# Patient Record
Sex: MALE | Race: BLACK OR AFRICAN AMERICAN | NOT HISPANIC OR LATINO
[De-identification: names, ages, dates, MRNs, and addresses within clinical notes are randomized per-mention and may not be internally consistent; named-entity substitution may affect disease eponyms.]

---

## 2023-11-08 PROBLEM — Z00.00 ENCOUNTER FOR PREVENTIVE HEALTH EXAMINATION: Status: ACTIVE | Noted: 2023-11-08

## 2023-11-10 ENCOUNTER — APPOINTMENT (OUTPATIENT)
Dept: PAIN MANAGEMENT | Facility: CLINIC | Age: 53
End: 2023-11-10
Payer: COMMERCIAL

## 2023-11-10 DIAGNOSIS — Z86.59 PERSONAL HISTORY OF OTHER MENTAL AND BEHAVIORAL DISORDERS: ICD-10-CM

## 2023-11-10 PROCEDURE — 99204 OFFICE O/P NEW MOD 45 MIN: CPT

## 2023-11-10 RX ORDER — GABAPENTIN 400 MG/1
400 CAPSULE ORAL
Refills: 0 | Status: ACTIVE | COMMUNITY

## 2023-11-10 RX ORDER — METOPROLOL SUCCINATE 25 MG/1
25 TABLET, EXTENDED RELEASE ORAL
Refills: 0 | Status: ACTIVE | COMMUNITY

## 2023-11-10 RX ORDER — GABAPENTIN 400 MG/1
400 CAPSULE ORAL 3 TIMES DAILY
Qty: 90 | Refills: 3 | Status: ACTIVE | COMMUNITY
Start: 2023-11-10 | End: 1900-01-01

## 2023-11-30 ENCOUNTER — APPOINTMENT (OUTPATIENT)
Dept: PAIN MANAGEMENT | Facility: CLINIC | Age: 53
End: 2023-11-30

## 2024-01-10 ENCOUNTER — NON-APPOINTMENT (OUTPATIENT)
Age: 54
End: 2024-01-10

## 2024-01-17 ENCOUNTER — APPOINTMENT (OUTPATIENT)
Dept: PAIN MANAGEMENT | Facility: CLINIC | Age: 54
End: 2024-01-17
Payer: COMMERCIAL

## 2024-01-17 VITALS
SYSTOLIC BLOOD PRESSURE: 160 MMHG | WEIGHT: 270 LBS | DIASTOLIC BLOOD PRESSURE: 100 MMHG | BODY MASS INDEX: 37.8 KG/M2 | HEIGHT: 71 IN

## 2024-01-17 VITALS — DIASTOLIC BLOOD PRESSURE: 118 MMHG | SYSTOLIC BLOOD PRESSURE: 180 MMHG

## 2024-01-17 PROCEDURE — 99204 OFFICE O/P NEW MOD 45 MIN: CPT

## 2024-01-17 PROCEDURE — G2211 COMPLEX E/M VISIT ADD ON: CPT

## 2024-01-17 RX ORDER — TIZANIDINE 2 MG/1
2 TABLET ORAL
Qty: 45 | Refills: 1 | Status: ACTIVE | COMMUNITY
Start: 2024-01-17 | End: 1900-01-01
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
## 2024-01-17 NOTE — ASSESSMENT
[FreeTextEntry1] : >> Imaging and Other Studies   I personally reviewed the relevant imaging.  Discussed and explained to patient the likely source of pathology and pain.  Questions answered. MRI   >> Therapy and Other Modalities   PT  >> Medications  I advised KOREY that the NSAID should be taken with food.  In addition while taking the prescribed NSAID, no over the counter or other NSAIDs should be used, such as ibuprofen (Motrin or Advil) or naproxen (Aleve) as this can cause stomach upset or other side effects.  If needed for fever or breakthrough pain Tylenol can be used.  acetaminophen 650mg q8h prn pain (caution <3g daily)  gabapentin 400mg TID  trial tizanidine    >> Interventions   Significant component of back and leg pain likely secondary to lumbar spinal stenosis demonstrated on MRI LS.  Will schedule L4-5 interlaminar epidural steroid injection r/b/a discussed  after patient improves bp control  >> Consults   hypertensive urgency - patient brought to ED  denies cp, sob, blurry vision  >> Discussion of Risks/Benefits/Alternatives    >Regarding any scheduled procedures:   In the event the patient is scheduled for a procedure,   I have discussed in detail with the patient that any interventional pain procedure is associated with potential risks.  The procedure may include an injection of steroids and potentially other medications (local anesthetic and normal saline) into the epidural space or surrounding tissue of the spine.  There are significant risks of this procedure which include and are not limited to infection, bleeding, worsening pain, dural puncture leading to postdural puncture headache, nerve damage, spinal cord injury, paralysis, stroke, and death.   There is a chance that the procedure does not improve their pain.   There are risks associated with the steroid being absorbed into the body systemically.  These include dysphoria, difficulty sleeping, mood swings and personality changes.  Premenopausal women may notice an irregularity in her menstrual cycle for 2-3 months following the injection.  Steroids can specifically affect patients with hypertension, diabetes, and peptic ulcers.  The procedure may cause a temporary increase in blood pressure and blood pressure, and may adversely affect a peptic ulcer.  Other, more rare complications, include avascular necrosis of joints, glaucoma and worsening of osteoporosis.   I have discussed the risks of the procedure at length with the patient, and the potential benefits of pain relief.  I have offered alternatives to the procedure.  All questions were answered.   The patient expressed understanding and wishes to proceed with the procedure.   > Longitudinal management of Complex Painful condition   The patient is being managed for a complex condition that requires ongoing management.  The nature of this condition demands nuanced approach to treatment.  The seriousness of the condition necessitates an in-depth and focused approach to management and coordination with other healthcare professionals.    This visit involves intricate evaluation and management of the patient's condition.  The complexity of the visit was due to the need for detailed assessment of the current state, consideration of potential complications and a careful balancing of treatment options to management the chronic condition effectively.   As detailed above, the patient has a chronic significant painful condition that requires regular and detailed management.  The condition's impact on the patient's quality of life and health is substantial and necessitates a comprehensive and tailored approach   >> Conclusion   There were no barriers to communication. Informed patient that I would be available for any additional questions. Patient was instructed to call with any worsening symptoms including severe pain, new numbness/weakness, or changes in the bowel/bladder function. Discussed role of nsaids in pain management and all relevant risks, if patient is continuing to require after 4 weeks the patient should f/u for alternative treatment. Instructed patient to maintain pain diary to monitor pain level, mobility, and function.

## 2024-02-02 ENCOUNTER — RESULT REVIEW (OUTPATIENT)
Age: 54
End: 2024-02-02

## 2024-02-02 ENCOUNTER — APPOINTMENT (OUTPATIENT)
Dept: NEPHROLOGY | Facility: CLINIC | Age: 54
End: 2024-02-02
Payer: COMMERCIAL

## 2024-02-02 ENCOUNTER — APPOINTMENT (OUTPATIENT)
Dept: PAIN MANAGEMENT | Facility: CLINIC | Age: 54
End: 2024-02-02
Payer: COMMERCIAL

## 2024-02-02 VITALS
WEIGHT: 270 LBS | HEIGHT: 71 IN | OXYGEN SATURATION: 99 % | BODY MASS INDEX: 37.8 KG/M2 | DIASTOLIC BLOOD PRESSURE: 98 MMHG | SYSTOLIC BLOOD PRESSURE: 150 MMHG | HEART RATE: 70 BPM

## 2024-02-02 DIAGNOSIS — M51.9 UNSPECIFIED THORACIC, THORACOLUMBAR AND LUMBOSACRAL INTERVERTEBRAL DISC DISORDER: ICD-10-CM

## 2024-02-02 DIAGNOSIS — I16.0 HYPERTENSIVE URGENCY: ICD-10-CM

## 2024-02-02 DIAGNOSIS — M51.26 OTHER INTERVERTEBRAL DISC DISPLACEMENT, LUMBAR REGION: ICD-10-CM

## 2024-02-02 PROCEDURE — G2211 COMPLEX E/M VISIT ADD ON: CPT

## 2024-02-02 PROCEDURE — 99214 OFFICE O/P EST MOD 30 MIN: CPT

## 2024-02-02 PROCEDURE — 99204 OFFICE O/P NEW MOD 45 MIN: CPT

## 2024-02-02 NOTE — ASSESSMENT
[FreeTextEntry1] : # HTN  -dx in his 30s, FHx of HTN  -BP has been 200-170s/100 -BP today 150/98 at the office  - cont metoprolol  - start hydrochlorothiazide 25 mg daily  - Lab, cbc, cmp, cystatin c, lovely, renin, Renal US  - Low salt diet  - 2 weeks BP monitor  - Need pain control  - Avoid NSAID   # LUTS possible BPH Renal US  - Reported dribbling, weak urinary stream, start doxazocin 2 mg bedtime

## 2024-02-02 NOTE — HISTORY OF PRESENT ILLNESS
[FreeTextEntry1] : Hx of HTN since his 30's. Mom and dad with hx of HTN  He has lumbar spine disck herniation, he went to pain management for injection. BP was elevated 210/104 and was sent to ed.  Today his injection was also cancelled due to elevated BP and was sent to office for BP management. He is currently taking metoprolol 25 mg bid.  No headache, change in vision, or weakness.  Reported weak urinary stream and dribbling with urination.  No hx of kidney disease.  currently taking meloxicam

## 2024-02-02 NOTE — ASSESSMENT
[FreeTextEntry1] : >> Imaging and Other Studies   I personally reviewed the relevant imaging.  Discussed and explained to patient the likely source of pathology and pain.  Questions answered. MRI   >> Therapy and Other Modalities   PT  >> Medications  I advised KOREY that the NSAID should be taken with food.  In addition while taking the prescribed NSAID, no over the counter or other NSAIDs should be used, such as ibuprofen (Motrin or Advil) or naproxen (Aleve) as this can cause stomach upset or other side effects.  If needed for fever or breakthrough pain Tylenol can be used.  acetaminophen 650mg q8h prn pain (caution <3g daily)  gabapentin 400mg TID  trial tizanidine    >> Interventions   SAMPSON postponed today secondary to hypertensive urgency  Significant component of back and leg pain likely secondary to lumbar spinal stenosis demonstrated on MRI LS.  Will schedule L4-5 interlaminar epidural steroid injection r/b/a discussed  after patient improves bp control  >> Consults   hypertensive urgency - referral to Dr. Lewis today for evaluation and treatment  denies cp, sob, blurry vision  >> Discussion of Risks/Benefits/Alternatives    >Regarding any scheduled procedures:   In the event the patient is scheduled for a procedure,   I have discussed in detail with the patient that any interventional pain procedure is associated with potential risks.  The procedure may include an injection of steroids and potentially other medications (local anesthetic and normal saline) into the epidural space or surrounding tissue of the spine.  There are significant risks of this procedure which include and are not limited to infection, bleeding, worsening pain, dural puncture leading to postdural puncture headache, nerve damage, spinal cord injury, paralysis, stroke, and death.   There is a chance that the procedure does not improve their pain.   There are risks associated with the steroid being absorbed into the body systemically.  These include dysphoria, difficulty sleeping, mood swings and personality changes.  Premenopausal women may notice an irregularity in her menstrual cycle for 2-3 months following the injection.  Steroids can specifically affect patients with hypertension, diabetes, and peptic ulcers.  The procedure may cause a temporary increase in blood pressure and blood pressure, and may adversely affect a peptic ulcer.  Other, more rare complications, include avascular necrosis of joints, glaucoma and worsening of osteoporosis.   I have discussed the risks of the procedure at length with the patient, and the potential benefits of pain relief.  I have offered alternatives to the procedure.  All questions were answered.   The patient expressed understanding and wishes to proceed with the procedure.   > Longitudinal management of Complex Painful condition   The patient is being managed for a complex condition that requires ongoing management.  The nature of this condition demands nuanced approach to treatment.  The seriousness of the condition necessitates an in-depth and focused approach to management and coordination with other healthcare professionals.    This visit involves intricate evaluation and management of the patient's condition.  The complexity of the visit was due to the need for detailed assessment of the current state, consideration of potential complications and a careful balancing of treatment options to management the chronic condition effectively.   As detailed above, the patient has a chronic significant painful condition that requires regular and detailed management.  The condition's impact on the patient's quality of life and health is substantial and necessitates a comprehensive and tailored approach   >> Conclusion   There were no barriers to communication. Informed patient that I would be available for any additional questions. Patient was instructed to call with any worsening symptoms including severe pain, new numbness/weakness, or changes in the bowel/bladder function. Discussed role of nsaids in pain management and all relevant risks, if patient is continuing to require after 4 weeks the patient should f/u for alternative treatment. Instructed patient to maintain pain diary to monitor pain level, mobility, and function.
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
## 2024-02-02 NOTE — REVIEW OF SYSTEMS
[Fever] : no fever [Chills] : no chills [Chest Pain] : no chest pain [Palpitations] : no palpitations [Cough] : no cough [SOB on Exertion] : no shortness of breath during exertion [Constipation] : no constipation [Diarrhea] : no diarrhea [Dysuria] : no dysuria [Nocturia] : nocturia

## 2024-02-02 NOTE — PHYSICAL EXAM
[General Appearance - Alert] : alert [General Appearance - In No Acute Distress] : in no acute distress [Respiration, Rhythm And Depth] : normal respiratory rhythm and effort [Auscultation Breath Sounds / Voice Sounds] : lungs were clear to auscultation bilaterally [Heart Rate And Rhythm] : heart rate was normal and rhythm regular [Heart Sounds] : normal S1 and S2 [Edema] : there was no peripheral edema [Abdomen Soft] : soft [Abdomen Tenderness] : non-tender [Abnormal Walk] : normal gait

## 2024-02-16 ENCOUNTER — APPOINTMENT (OUTPATIENT)
Dept: NEPHROLOGY | Facility: CLINIC | Age: 54
End: 2024-02-16
Payer: COMMERCIAL

## 2024-02-16 VITALS — SYSTOLIC BLOOD PRESSURE: 142 MMHG | DIASTOLIC BLOOD PRESSURE: 88 MMHG

## 2024-02-16 PROCEDURE — ZZZZZ: CPT

## 2024-03-01 ENCOUNTER — RESULT REVIEW (OUTPATIENT)
Age: 54
End: 2024-03-01

## 2024-03-01 ENCOUNTER — APPOINTMENT (OUTPATIENT)
Dept: NEPHROLOGY | Facility: CLINIC | Age: 54
End: 2024-03-01
Payer: COMMERCIAL

## 2024-03-01 PROCEDURE — 36415 COLL VENOUS BLD VENIPUNCTURE: CPT

## 2024-03-04 ENCOUNTER — TRANSCRIPTION ENCOUNTER (OUTPATIENT)
Age: 54
End: 2024-03-04

## 2024-03-20 ENCOUNTER — APPOINTMENT (OUTPATIENT)
Dept: NEPHROLOGY | Facility: CLINIC | Age: 54
End: 2024-03-20
Payer: COMMERCIAL

## 2024-03-20 VITALS
HEIGHT: 71 IN | BODY MASS INDEX: 38.78 KG/M2 | HEART RATE: 59 BPM | OXYGEN SATURATION: 98 % | DIASTOLIC BLOOD PRESSURE: 80 MMHG | WEIGHT: 277 LBS | SYSTOLIC BLOOD PRESSURE: 122 MMHG

## 2024-03-20 PROCEDURE — 99214 OFFICE O/P EST MOD 30 MIN: CPT

## 2024-03-20 RX ORDER — MELOXICAM 15 MG/1
15 TABLET ORAL TWICE DAILY
Qty: 60 | Refills: 0 | Status: DISCONTINUED | COMMUNITY
Start: 2023-11-10 | End: 2024-03-20

## 2024-03-20 NOTE — HISTORY OF PRESENT ILLNESS
[FreeTextEntry1] : Hx of HTN since his 30's. Mom and dad with hx of HTN  He has lumbar spine disck herniation, he went to pain management for injection. BP was elevated 210/104 and was sent to ed.  Today his injection was also cancelled due to elevated BP and was sent to office for BP management. He is currently taking metoprolol 25 mg bid.  No headache, change in vision, or weakness.  Reported weak urinary stream and dribbling with urination.  No hx of kidney disease.  currently taking meloxicam   3/19 Patient is feeling well but reported being more tired with poor sleep due to work and home family obligations.  BP fluctuating at home sbp 160-140, however sometime feel not himself, feel off once take his bp med all at once. Had not taken his BP during those episodes.   His back pain has significantly improved and does not feel like he needs more injection.  He reported taking his med and adherent to low salt diet.  his metoprolol dose was increase to 50 mg by his pain management MD. Urinary symptoms impred

## 2024-03-20 NOTE — PHYSICAL EXAM
[General Appearance - Alert] : alert [General Appearance - In No Acute Distress] : in no acute distress [Heart Sounds] : normal S1 and S2 [Heart Rate And Rhythm] : heart rate was normal and rhythm regular [Abnormal Walk] : normal gait [Edema] : there was no peripheral edema [Oriented To Time, Place, And Person] : oriented to person, place, and time [Impaired Insight] : insight and judgment were intact

## 2024-03-20 NOTE — REVIEW OF SYSTEMS
[Fever] : no fever [Chest Pain] : no chest pain [Chills] : no chills [Palpitations] : no palpitations [Cough] : no cough [SOB on Exertion] : no shortness of breath during exertion [Constipation] : no constipation [Diarrhea] : no diarrhea [Dysuria] : no dysuria [Hesitancy] : no urinary hesitancy [Dizziness] : no dizziness

## 2024-03-20 NOTE — ASSESSMENT
[FreeTextEntry1] : # HTN improving uncontrolled, -dx in his 30s, FHx of HTN -BP has been 200-170s/100, now 160-140s/80s -BP in the office 122/80, took his med this morning. Reported episode of feeling off after taking his med. Advise to monitor his BP during those episodes, unclear if related to hypotensive episode. He was also taking Cardura in the morning, advise to take bedtime which can cause some dizziness sometimes.  - cont metoprolol 25 mg bid - cont hydrochlorothiazide 25 mg daily - cont losartan 25 mg with goal to increase if bp not controlled - Low salt diet - 4 weeks BP monitor - Avoid NSAID, advise good sleep hygiene  # LUTS possible BPH Renal US - Reported dribbling, weak urinary stream, symptoms improved.  - cont  doxazocin 2 mg bedtime.

## 2024-04-16 ENCOUNTER — APPOINTMENT (OUTPATIENT)
Dept: PAIN MANAGEMENT | Facility: CLINIC | Age: 54
End: 2024-04-16
Payer: COMMERCIAL

## 2024-04-16 VITALS
WEIGHT: 277 LBS | DIASTOLIC BLOOD PRESSURE: 81 MMHG | SYSTOLIC BLOOD PRESSURE: 158 MMHG | BODY MASS INDEX: 38.78 KG/M2 | HEIGHT: 71 IN

## 2024-04-16 DIAGNOSIS — M79.18 MYALGIA, OTHER SITE: ICD-10-CM

## 2024-04-16 DIAGNOSIS — G89.4 CHRONIC PAIN SYNDROME: ICD-10-CM

## 2024-04-16 DIAGNOSIS — M79.2 NEURALGIA AND NEURITIS, UNSPECIFIED: ICD-10-CM

## 2024-04-16 DIAGNOSIS — M54.16 RADICULOPATHY, LUMBAR REGION: ICD-10-CM

## 2024-04-16 PROCEDURE — 99214 OFFICE O/P EST MOD 30 MIN: CPT

## 2024-04-16 NOTE — ASSESSMENT
[FreeTextEntry1] : >> Imaging and Other Studies   I personally reviewed the relevant imaging.  Discussed and explained to patient the likely source of pathology and pain.  Questions answered. MRI   >> Therapy and Other Modalities   exercises  >> Medications  I advised KOREY that the NSAID should be taken with food.  In addition while taking the prescribed NSAID, no over the counter or other NSAIDs should be used, such as ibuprofen (Motrin or Advil) or naproxen (Aleve) as this can cause stomach upset or other side effects.  If needed for fever or breakthrough pain Tylenol can be used.  acetaminophen 650mg q8h prn pain (caution <3g daily)  weaned from gabapentin   continue tizanidine    >> Interventions    Significant component of back and leg pain likely secondary to lumbar spinal stenosis demonstrated on MRI LS.  Will consider L4-5 interlaminar epidural steroid injection r/b/a discussed  after patient improves bp control  >> Consults   hypertensive urgency - referral to Dr. Lewis today for evaluation and treatment  denies cp, sob, blurry vision  >> Discussion of Risks/Benefits/Alternatives    >Regarding any scheduled procedures:   In the event the patient is scheduled for a procedure,   I have discussed in detail with the patient that any interventional pain procedure is associated with potential risks.  The procedure may include an injection of steroids and potentially other medications (local anesthetic and normal saline) into the epidural space or surrounding tissue of the spine.  There are significant risks of this procedure which include and are not limited to infection, bleeding, worsening pain, dural puncture leading to postdural puncture headache, nerve damage, spinal cord injury, paralysis, stroke, and death.   There is a chance that the procedure does not improve their pain.   There are risks associated with the steroid being absorbed into the body systemically.  These include dysphoria, difficulty sleeping, mood swings and personality changes.  Premenopausal women may notice an irregularity in her menstrual cycle for 2-3 months following the injection.  Steroids can specifically affect patients with hypertension, diabetes, and peptic ulcers.  The procedure may cause a temporary increase in blood pressure and blood pressure, and may adversely affect a peptic ulcer.  Other, more rare complications, include avascular necrosis of joints, glaucoma and worsening of osteoporosis.   I have discussed the risks of the procedure at length with the patient, and the potential benefits of pain relief.  I have offered alternatives to the procedure.  All questions were answered.   The patient expressed understanding and wishes to proceed with the procedure.   > Longitudinal management of Complex Painful condition   The patient is being managed for a complex condition that requires ongoing management.  The nature of this condition demands nuanced approach to treatment.  The seriousness of the condition necessitates an in-depth and focused approach to management and coordination with other healthcare professionals.    This visit involves intricate evaluation and management of the patient's condition.  The complexity of the visit was due to the need for detailed assessment of the current state, consideration of potential complications and a careful balancing of treatment options to management the chronic condition effectively.   As detailed above, the patient has a chronic significant painful condition that requires regular and detailed management.  The condition's impact on the patient's quality of life and health is substantial and necessitates a comprehensive and tailored approach   >> Conclusion   There were no barriers to communication. Informed patient that I would be available for any additional questions. Patient was instructed to call with any worsening symptoms including severe pain, new numbness/weakness, or changes in the bowel/bladder function. Discussed role of nsaids in pain management and all relevant risks, if patient is continuing to require after 4 weeks the patient should f/u for alternative treatment. Instructed patient to maintain pain diary to monitor pain level, mobility, and function.
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
## 2024-04-16 NOTE — PHYSICAL EXAM
[Normal muscle bulk without asymmetry] : normal muscle bulk without asymmetry [] : Motor: [Normal] : Normal affect

## 2024-04-17 ENCOUNTER — APPOINTMENT (OUTPATIENT)
Dept: NEPHROLOGY | Facility: CLINIC | Age: 54
End: 2024-04-17
Payer: COMMERCIAL

## 2024-04-17 PROCEDURE — 99211 OFF/OP EST MAY X REQ PHY/QHP: CPT

## 2024-04-30 ENCOUNTER — APPOINTMENT (OUTPATIENT)
Dept: NEPHROLOGY | Facility: CLINIC | Age: 54
End: 2024-04-30
Payer: COMMERCIAL

## 2024-04-30 VITALS
BODY MASS INDEX: 38.36 KG/M2 | SYSTOLIC BLOOD PRESSURE: 140 MMHG | DIASTOLIC BLOOD PRESSURE: 70 MMHG | WEIGHT: 274 LBS | HEART RATE: 61 BPM | HEIGHT: 71 IN | OXYGEN SATURATION: 97 %

## 2024-04-30 DIAGNOSIS — I10 ESSENTIAL (PRIMARY) HYPERTENSION: ICD-10-CM

## 2024-04-30 PROCEDURE — 99214 OFFICE O/P EST MOD 30 MIN: CPT

## 2024-04-30 RX ORDER — LOSARTAN POTASSIUM 50 MG/1
50 TABLET, FILM COATED ORAL DAILY
Qty: 90 | Refills: 0 | Status: ACTIVE | COMMUNITY
Start: 2024-02-16 | End: 1900-01-01

## 2024-04-30 RX ORDER — HYDROCHLOROTHIAZIDE 25 MG/1
25 TABLET ORAL DAILY
Qty: 90 | Refills: 1 | Status: ACTIVE | COMMUNITY
Start: 2024-02-02 | End: 1900-01-01

## 2024-04-30 NOTE — REVIEW OF SYSTEMS
[Fever] : no fever [Chills] : no chills [Chest Pain] : no chest pain [Palpitations] : no palpitations [Cough] : no cough [SOB on Exertion] : no shortness of breath during exertion [Dysuria] : no dysuria [Hesitancy] : no urinary hesitancy [Nocturia] : no nocturia [Dizziness] : no dizziness

## 2024-04-30 NOTE — ASSESSMENT
[FreeTextEntry1] : # HTN improving uncontrolled, -dx in his 30s, FHx of HTN -BP has been 200-170s/100, now 140-130s/80s - Secondary HTN work up with low renin, low aldosterone level, likely normal physiologic response to HTN - Egfr 60-70 ml/mn - cont metoprolol 25 mg bid - cont hydrochlorothiazide 25 mg daily - increase  losartan to 50 mg  - Low salt diet, weight loss reinforced - Avoid NSAID, advise good sleep hygiene -Repeat lab  # LUTS possible BPH -Renal US - Reported dribbling, weak urinary stream, symptoms improved. - cont doxazocin 2 mg bedtime.

## 2024-04-30 NOTE — PHYSICAL EXAM
[General Appearance - Alert] : alert [General Appearance - In No Acute Distress] : in no acute distress [Heart Rate And Rhythm] : heart rate was normal and rhythm regular [Heart Sounds] : normal S1 and S2 [Edema] : there was no peripheral edema [Abnormal Walk] : normal gait [Oriented To Time, Place, And Person] : oriented to person, place, and time

## 2024-04-30 NOTE — HISTORY OF PRESENT ILLNESS
[FreeTextEntry1] : Hx of HTN since his 30's. Mom and dad with hx of HTN  He has lumbar spine disck herniation, he went to pain management for injection. BP was elevated 210/104 and was sent to ed.  Today his injection was also cancelled due to elevated BP and was sent to office for BP management. He is currently taking metoprolol 25 mg bid.  No headache, change in vision, or weakness.  Reported weak urinary stream and dribbling with urination.  No hx of kidney disease.  currently taking meloxicam   3/19 Patient is feeling well but reported being more tired with poor sleep due to work and home family obligations.  BP fluctuating at home sbp 160-140, however sometime feel not himself, feel off once take his bp med all at once. Had not taken his BP during those episodes.   His back pain has significantly improved and does not feel like he needs more injection.  He reported taking his med and adherent to low salt diet.  Urinary symptoms impr  4/30  He is feeling well, He is working on losing weight and adherence to low salt diet which is challenging as patient often eat out. Home -130.

## 2024-05-13 ENCOUNTER — RX RENEWAL (OUTPATIENT)
Age: 54
End: 2024-05-13

## 2024-05-13 RX ORDER — TIZANIDINE 2 MG/1
2 TABLET ORAL
Qty: 45 | Refills: 0 | Status: ACTIVE | COMMUNITY
Start: 2024-02-02 | End: 1900-01-01

## 2024-05-21 ENCOUNTER — APPOINTMENT (OUTPATIENT)
Dept: PAIN MANAGEMENT | Facility: CLINIC | Age: 54
End: 2024-05-21

## 2024-05-30 RX ORDER — DOXAZOSIN 2 MG/1
2 TABLET ORAL DAILY
Qty: 90 | Refills: 0 | Status: ACTIVE | COMMUNITY
Start: 2024-02-02 | End: 1900-01-01

## 2024-07-17 ENCOUNTER — RESULT REVIEW (OUTPATIENT)
Age: 54
End: 2024-07-17

## 2024-07-17 ENCOUNTER — APPOINTMENT (OUTPATIENT)
Dept: NEPHROLOGY | Facility: CLINIC | Age: 54
End: 2024-07-17
Payer: COMMERCIAL

## 2024-07-17 DIAGNOSIS — I10 ESSENTIAL (PRIMARY) HYPERTENSION: ICD-10-CM

## 2024-07-17 DIAGNOSIS — I16.0 HYPERTENSIVE URGENCY: ICD-10-CM

## 2024-07-17 PROCEDURE — 36415 COLL VENOUS BLD VENIPUNCTURE: CPT

## 2024-07-24 ENCOUNTER — APPOINTMENT (OUTPATIENT)
Dept: NEPHROLOGY | Facility: CLINIC | Age: 54
End: 2024-07-24

## 2024-09-06 ENCOUNTER — APPOINTMENT (OUTPATIENT)
Dept: NEPHROLOGY | Facility: CLINIC | Age: 54
End: 2024-09-06
Payer: COMMERCIAL

## 2024-09-06 VITALS
HEART RATE: 79 BPM | BODY MASS INDEX: 38.36 KG/M2 | WEIGHT: 274 LBS | DIASTOLIC BLOOD PRESSURE: 80 MMHG | OXYGEN SATURATION: 98 % | SYSTOLIC BLOOD PRESSURE: 160 MMHG | HEIGHT: 71 IN

## 2024-09-06 DIAGNOSIS — G47.30 SLEEP APNEA, UNSPECIFIED: ICD-10-CM

## 2024-09-06 DIAGNOSIS — I10 ESSENTIAL (PRIMARY) HYPERTENSION: ICD-10-CM

## 2024-09-06 PROCEDURE — G2211 COMPLEX E/M VISIT ADD ON: CPT

## 2024-09-06 PROCEDURE — 99214 OFFICE O/P EST MOD 30 MIN: CPT

## 2024-09-06 NOTE — HISTORY OF PRESENT ILLNESS
[FreeTextEntry1] : Hx of HTN since his 30's. Mom and dad with hx of HTN  He has lumbar spine disck herniation, he went to pain management for injection. BP was elevated 210/104 and was sent to ed.  Today his injection was also cancelled due to elevated BP and was sent to office for BP management. He is currently taking metoprolol 25 mg bid.  No headache, change in vision, or weakness.  Reported weak urinary stream and dribbling with urination.  No hx of kidney disease.  currently taking meloxicam   3/19 Patient is feeling well but reported being more tired with poor sleep due to work and home family obligations.  BP fluctuating at home sbp 160-140, however sometime feel not himself, feel off once take his bp med all at once. Had not taken his BP during those episodes.   His back pain has significantly improved and does not feel like he needs more injection.  He reported taking his med and adherent to low salt diet.  Urinary symptoms impr  4/30  He is feeling well, He is working on losing weight and adherence to low salt diet which is challenging as patient often eat out. Home -130.  9/6  BP high 160/80 Has no complaints. Reported has been cutting down sodium on diet. He has hx of ROMI, he had not use his cpap for the past 2 weeks as part of it is broken, he just got a new machine.  Lab reviewed and discussed with patient.  Patient woks overnight, he had not taken a rest yet, he took his med last night and this morning.

## 2024-09-06 NOTE — ASSESSMENT
[FreeTextEntry1] : # HTN BP uncontrolled, -dx in his 30s, FHx of HTN -BP was improving 140-130s/80s now rising again 160/80 today. Report compliance to medication but poor adherence to low salt diet. - Secondary HTN work up with low renin, low aldosterone level, likely normal physiologic response to HTN - cystatin c 1.08,  Egfr 73 ml/mn - cont metoprolol 25 mg bid - cont hydrochlorothiazide 25 mg daily - increase losartan to 100 mg - Low salt diet, weight loss reinforced - Avoid NSAID, advise good sleep hygiene. Encouraged to use PAP for ROMI -Repeat bmp 2 weeks  # LUTS possible BPH, improved -Renal US, not done, will reorder - cont doxazocin 2 mg bedtime.

## 2024-10-07 DIAGNOSIS — I10 ESSENTIAL (PRIMARY) HYPERTENSION: ICD-10-CM

## 2024-10-07 RX ORDER — METOPROLOL SUCCINATE 25 MG/1
25 TABLET, EXTENDED RELEASE ORAL TWICE DAILY
Qty: 180 | Refills: 1 | Status: DISCONTINUED | COMMUNITY
Start: 2024-10-07 | End: 2024-10-07

## 2024-10-07 RX ORDER — METOPROLOL SUCCINATE 25 MG/1
25 TABLET, EXTENDED RELEASE ORAL TWICE DAILY
Qty: 180 | Refills: 0 | Status: ACTIVE | COMMUNITY
Start: 2024-10-07 | End: 1900-01-01

## 2024-12-04 ENCOUNTER — APPOINTMENT (OUTPATIENT)
Dept: NEPHROLOGY | Facility: CLINIC | Age: 54
End: 2024-12-04
Payer: COMMERCIAL

## 2024-12-04 VITALS
DIASTOLIC BLOOD PRESSURE: 90 MMHG | OXYGEN SATURATION: 99 % | HEART RATE: 60 BPM | BODY MASS INDEX: 38.78 KG/M2 | SYSTOLIC BLOOD PRESSURE: 150 MMHG | HEIGHT: 71 IN | WEIGHT: 277 LBS

## 2024-12-04 DIAGNOSIS — I10 ESSENTIAL (PRIMARY) HYPERTENSION: ICD-10-CM

## 2024-12-04 PROCEDURE — 99214 OFFICE O/P EST MOD 30 MIN: CPT

## 2024-12-04 RX ORDER — LOSARTAN POTASSIUM AND HYDROCHLOROTHIAZIDE 25; 100 MG/1; MG/1
100-25 TABLET ORAL DAILY
Qty: 90 | Refills: 1 | Status: ACTIVE | COMMUNITY
Start: 2024-12-04 | End: 1900-01-01

## 2024-12-04 RX ORDER — NIFEDIPINE 60 MG/1
60 TABLET, EXTENDED RELEASE ORAL DAILY
Qty: 90 | Refills: 1 | Status: ACTIVE | COMMUNITY
Start: 2024-12-04 | End: 1900-01-01

## 2025-04-04 ENCOUNTER — APPOINTMENT (OUTPATIENT)
Dept: NEPHROLOGY | Facility: CLINIC | Age: 55
End: 2025-04-04

## 2025-06-18 ENCOUNTER — NON-APPOINTMENT (OUTPATIENT)
Age: 55
End: 2025-06-18

## 2025-06-20 ENCOUNTER — APPOINTMENT (OUTPATIENT)
Dept: NEPHROLOGY | Facility: CLINIC | Age: 55
End: 2025-06-20
Payer: COMMERCIAL

## 2025-06-20 VITALS
SYSTOLIC BLOOD PRESSURE: 140 MMHG | HEIGHT: 71 IN | HEART RATE: 76 BPM | BODY MASS INDEX: 38.36 KG/M2 | DIASTOLIC BLOOD PRESSURE: 86 MMHG | WEIGHT: 274 LBS | OXYGEN SATURATION: 98 %

## 2025-06-20 PROCEDURE — 99214 OFFICE O/P EST MOD 30 MIN: CPT
